# Patient Record
Sex: MALE | Race: BLACK OR AFRICAN AMERICAN | NOT HISPANIC OR LATINO | Employment: FULL TIME | URBAN - METROPOLITAN AREA
[De-identification: names, ages, dates, MRNs, and addresses within clinical notes are randomized per-mention and may not be internally consistent; named-entity substitution may affect disease eponyms.]

---

## 2019-12-09 ENCOUNTER — OFFICE VISIT (OUTPATIENT)
Dept: FAMILY MEDICINE CLINIC | Facility: CLINIC | Age: 39
End: 2019-12-09

## 2019-12-09 VITALS
HEART RATE: 66 BPM | BODY MASS INDEX: 28.1 KG/M2 | SYSTOLIC BLOOD PRESSURE: 124 MMHG | WEIGHT: 226 LBS | HEIGHT: 75 IN | OXYGEN SATURATION: 98 % | RESPIRATION RATE: 16 BRPM | DIASTOLIC BLOOD PRESSURE: 70 MMHG

## 2019-12-09 DIAGNOSIS — J30.89 CHRONIC NON-SEASONAL ALLERGIC RHINITIS: Primary | ICD-10-CM

## 2019-12-09 DIAGNOSIS — Z23 NEED FOR VACCINATION: ICD-10-CM

## 2019-12-09 DIAGNOSIS — K59.09 CHRONIC CONSTIPATION: ICD-10-CM

## 2019-12-09 PROCEDURE — 90471 IMMUNIZATION ADMIN: CPT | Performed by: FAMILY MEDICINE

## 2019-12-09 PROCEDURE — 99203 OFFICE O/P NEW LOW 30 MIN: CPT | Performed by: FAMILY MEDICINE

## 2019-12-09 PROCEDURE — 90674 CCIIV4 VAC NO PRSV 0.5 ML IM: CPT | Performed by: FAMILY MEDICINE

## 2019-12-09 RX ORDER — FLUTICASONE PROPIONATE 50 MCG
2 SPRAY, SUSPENSION (ML) NASAL DAILY
Qty: 18.2 ML | Refills: 5 | Status: SHIPPED | OUTPATIENT
Start: 2019-12-09 | End: 2019-12-10 | Stop reason: SDUPTHER

## 2019-12-09 NOTE — PROGRESS NOTES
Christina Graham is a 39 y.o. male. Pt is a new pt for the clinic and he is here to establish new PCP and talk about depression, pain in her knees and sore throat and headache.   He started having sore throat and headaches yesterday.   He states has pain in knees, bilateral, for about 2 years. Comes and goes. Denies swelling or trauma.   Pt feels a little depressed since he moved from [place] 5 months ago. He is alone, lives by himself, and has no friends. States wants to do nothing but be in the bed and drink alcohol at weekend. Denies alcohol consume during the week.     Chief Complaint   Patient presents with   • Sore Throat   • Knee Pain       HPI     Here as above. Moved for work, has an ex-girlfriend and two children back in [place]. Hasn't been able to establish much of a social life here yet as he's been working hard.  PHQ 9 score is 7 today.    Has chronic sinus congestion and allergic rhinitis. Was previously prescribed a nasal steroid which help symptoms significantly, but hasn't had any in a while. Interested in re-starting.     Complains of occasional bleeding with bowel movements.  Reports infrequent hard stools.  Tries to stay well-hydrated, tries to eat a diet high in fiber, however is not able to do as well as the like.  Not currently taking any laxatives, nor any bowel regulators.    Has not yet had a flu shot this year, willing to do so today.    The following portions of the patient's history were reviewed and updated as appropriate: allergies, current medications, past family history, past medical history, past social history, past surgical history and problem list.    Review of Systems   HENT: Positive for ear pain and sore throat.    Musculoskeletal: Positive for arthralgias.   Neurological: Positive for headaches.   Psychiatric/Behavioral: Positive for sleep disturbance. The patient is nervous/anxious.    All other systems reviewed and are negative.    I have reviewed the ROS as documented by  the MA. Faizan Macario MD    Objective  Vitals:    12/09/19 1126   BP: 124/70   Pulse: 66   Resp: 16   SpO2: 98%     Body mass index is 28.25 kg/m².    Physical Exam   Constitutional: He is oriented to person, place, and time. He appears well-developed and well-nourished. No distress.   HENT:   Nose: Mucosal edema present. No rhinorrhea.   Eyes: Pupils are equal, round, and reactive to light. EOM are normal.   Neck: Normal range of motion. Neck supple.   Cardiovascular: Normal rate, regular rhythm, normal heart sounds and intact distal pulses.   No murmur heard.  Pulmonary/Chest: Effort normal and breath sounds normal. No respiratory distress. He has no wheezes.   Abdominal: Soft. Bowel sounds are normal. There is no tenderness. There is no rebound and no guarding.   Musculoskeletal: Normal range of motion.   Neurological: He is alert and oriented to person, place, and time.   Skin: Skin is warm and dry.   Areas of xerosis on cheeks, around hair line, and on hands   Psychiatric: He has a normal mood and affect. His behavior is normal.   Nursing note and vitals reviewed.        Current Outpatient Medications:   •  fluticasone (FLONASE) 50 MCG/ACT nasal spray, 2 sprays into the nostril(s) as directed by provider Daily., Disp: 18.2 mL, Rfl: 5  •  polyethylene glycol (MIRALAX) powder powder, Mix one unit dose in 12-16 oz of water and take 1-4 times daily to begin having soft daily BMs. Taper down to once daily dosing as needed., Disp: 850 g, Rfl: 5  Current outpatient and discharge medications have been reconciled for the patient.  Reviewed by: Faizan Macario MD      Procedures    Lab Results (most recent)     None                  Christina was seen today for sore throat and knee pain.    Diagnoses and all orders for this visit:    Chronic non-seasonal allergic rhinitis  -     fluticasone (FLONASE) 50 MCG/ACT nasal spray; 2 sprays into the nostril(s) as directed by provider Daily.    Chronic  constipation    Other orders  -     polyethylene glycol (MIRALAX) powder powder; Mix one unit dose in 12-16 oz of water and take 1-4 times daily to begin having soft daily BMs. Taper down to once daily dosing as needed.    Orders as above.  Discussed proper use of fluticasone nasal spray.  Discussed proper titration of PEG for 1-2 soft bowel movements daily.  Encouraged him to get more hydration.  Follow-up as below.    Any information loaded into the AVS was placed there by KENNETH Macario MD, and patient was counseled on the same.   Return in about 4 weeks (around 1/6/2020).      KENNETH Macario MD

## 2019-12-09 NOTE — PATIENT INSTRUCTIONS
Buy some Cetaphil cream (white tub, blue lid) from pharmacy or grocery and use every day and after you shower to help with dry skin.      Constipation, Adult    Constipation is when a person:  · Poops (has a bowel movement) fewer times in a week than normal.  · Has a hard time pooping.  · Has poop that is dry, hard, or bigger than normal.  Follow these instructions at home:  Eating and drinking    · Eat foods that have a lot of fiber, such as:  ? Fresh fruits and vegetables.  ? Whole grains.  ? Beans.  · Eat less of foods that are high in fat, low in fiber, or overly processed, such as:  ? French fries.  ? Hamburgers.  ? Cookies.  ? Candy.  ? Soda.  · Drink enough fluid to keep your pee (urine) clear or pale yellow.  General instructions  · Exercise regularly or as told by your doctor.  · Go to the restroom when you feel like you need to poop. Do not hold it in.  · Take over-the-counter and prescription medicines only as told by your doctor. These include any fiber supplements.  · Do pelvic floor retraining exercises, such as:  ? Doing deep breathing while relaxing your lower belly (abdomen).  ? Relaxing your pelvic floor while pooping.  · Watch your condition for any changes.  · Keep all follow-up visits as told by your doctor. This is important.  Contact a doctor if:  · You have pain that gets worse.  · You have a fever.  · You have not pooped for 4 days.  · You throw up (vomit).  · You are not hungry.  · You lose weight.  · You are bleeding from the anus.  · You have thin, pencil-like poop (stool).  Get help right away if:  · You have a fever, and your symptoms suddenly get worse.  · You leak poop or have blood in your poop.  · Your belly feels hard or bigger than normal (is bloated).  · You have very bad belly pain.  · You feel dizzy or you faint.  This information is not intended to replace advice given to you by your health care provider. Make sure you discuss any questions you have with your health care  provider.  Document Released: 06/05/2009 Document Revised: 07/07/2017 Document Reviewed: 06/07/2017  [employer] Interactive Patient Education © 2019 [employer]

## 2019-12-10 DIAGNOSIS — J30.89 CHRONIC NON-SEASONAL ALLERGIC RHINITIS: ICD-10-CM

## 2019-12-10 RX ORDER — FLUTICASONE PROPIONATE 50 MCG
2 SPRAY, SUSPENSION (ML) NASAL DAILY
Qty: 18.2 ML | Refills: 5 | Status: SHIPPED | OUTPATIENT
Start: 2019-12-10

## 2019-12-10 NOTE — TELEPHONE ENCOUNTER
Pt called yesterday and left a VM saying would like his meds sent to a different pharmacy. Sound on msg was not great, could not understand pharmacy location. Called him, no answer.

## 2020-02-19 ENCOUNTER — OFFICE VISIT (OUTPATIENT)
Dept: FAMILY MEDICINE CLINIC | Facility: CLINIC | Age: 40
End: 2020-02-19

## 2020-02-19 ENCOUNTER — TELEPHONE (OUTPATIENT)
Dept: FAMILY MEDICINE CLINIC | Facility: CLINIC | Age: 40
End: 2020-02-19

## 2020-02-19 VITALS
BODY MASS INDEX: 26.49 KG/M2 | RESPIRATION RATE: 16 BRPM | OXYGEN SATURATION: 98 % | WEIGHT: 213 LBS | SYSTOLIC BLOOD PRESSURE: 120 MMHG | HEIGHT: 75 IN | HEART RATE: 73 BPM | DIASTOLIC BLOOD PRESSURE: 74 MMHG

## 2020-02-19 DIAGNOSIS — L85.3 XEROSIS CUTIS: ICD-10-CM

## 2020-02-19 DIAGNOSIS — K59.09 CHRONIC CONSTIPATION: Primary | ICD-10-CM

## 2020-02-19 PROCEDURE — 99213 OFFICE O/P EST LOW 20 MIN: CPT | Performed by: FAMILY MEDICINE

## 2020-02-19 NOTE — PROGRESS NOTES
Christina Graham is a 40 y.o. male. Pt is here for constipation f/u.     Chief Complaint   Patient presents with   • Constipation   • Rash       HPI         The following portions of the patient's history were reviewed and updated as appropriate: allergies, current medications, past family history, past medical history, past social history, past surgical history and problem list.    Review of Systems   Constitutional: Negative for activity change.   Gastrointestinal: Positive for abdominal distention and constipation.     I have reviewed and confirmed the ROS as documented by the MA. Faizan Macario MD    Objective  Vitals:    02/19/20 1359   BP: 120/74   Pulse: 73   Resp: 16   SpO2: 98%     Body mass index is 26.62 kg/m².    Physical Exam   Constitutional: He appears well-developed and well-nourished.   Abdominal: Soft. Bowel sounds are normal. He exhibits no distension. There is no tenderness.   Skin: Skin is dry.   Nursing note and vitals reviewed.        Current Outpatient Medications:   •  fluticasone (FLONASE) 50 MCG/ACT nasal spray, 2 sprays into the nostril(s) as directed by provider Daily., Disp: 18.2 mL, Rfl: 5  •  polyethylene glycol (MIRALAX) powder powder, Mix one unit dose in 12-16 oz of water and take 1-4 times daily to begin having soft daily BMs. Taper down to once daily dosing as needed., Disp: 850 g, Rfl: 5  Current outpatient and discharge medications have been reconciled for the patient.  Reviewed by: Faizan Macario MD      Procedures    Lab Results (most recent)     None                  Christina was seen today for constipation and rash.    Diagnoses and all orders for this visit:    Chronic constipation    Xerosis cutis    Recommended a few days off work for a bowel clean out, note given. He has sufficient supply of PEG at home.     Recommended sensitive skin soap and emollient after bathing. Info given in AVS.    Any information loaded into the AVS was placed there by KENNETH Feldman  MD Amirah, and patient was counseled on the same.   Return in about 3 months (around 5/19/2020).      KENNETH Macario MD

## 2020-02-19 NOTE — PATIENT INSTRUCTIONS
Wash with sensitive skin soap (like Dove) and then use a cream on dry skin after bathing - Cetaphil, or shea or lorie butter. Buy it in a jar if you can.

## 2020-02-19 NOTE — TELEPHONE ENCOUNTER
Pt called and stated that he thought something was suppose to be called in for him from [facility] today. Pharmacy stated they did not recieve anything.    Please advise    Pt call back  [phone]

## 2020-03-16 ENCOUNTER — TELEPHONE (OUTPATIENT)
Dept: PEDIATRICS | Facility: OTHER | Age: 40
End: 2020-03-16

## 2020-03-16 NOTE — TELEPHONE ENCOUNTER
PT CALLED STATING THAT HE HAD TO MISS WORK TODAY BECAUSE THE MEDICATION PRESCRIBED GAVE HIM DIARRHEA. WILL COME TO THE OFFICE TOMORROW TO  NOTE.

## 2020-03-24 ENCOUNTER — OFFICE VISIT (OUTPATIENT)
Dept: FAMILY MEDICINE CLINIC | Facility: CLINIC | Age: 40
End: 2020-03-24

## 2020-03-24 VITALS
RESPIRATION RATE: 16 BRPM | WEIGHT: 221 LBS | HEIGHT: 75 IN | BODY MASS INDEX: 27.48 KG/M2 | OXYGEN SATURATION: 98 % | HEART RATE: 72 BPM | SYSTOLIC BLOOD PRESSURE: 128 MMHG | DIASTOLIC BLOOD PRESSURE: 74 MMHG

## 2020-03-24 DIAGNOSIS — K62.89 ANAL IRRITATION: Primary | ICD-10-CM

## 2020-03-24 DIAGNOSIS — K59.09 CHRONIC CONSTIPATION: ICD-10-CM

## 2020-03-24 PROCEDURE — 99213 OFFICE O/P EST LOW 20 MIN: CPT | Performed by: FAMILY MEDICINE

## 2020-03-24 RX ORDER — DIAPER,BRIEF,INFANT-TODD,DISP
EACH MISCELLANEOUS 2 TIMES DAILY
Qty: 56 G | Refills: 0 | Status: SHIPPED | OUTPATIENT
Start: 2020-03-24

## 2020-03-24 NOTE — PROGRESS NOTES
Christina Graham is a 40 y.o. male. Pt is here for abdominal pain and rectal pain/bleeding .     Chief Complaint   Patient presents with   • Abdominal Pain   • Rectal Bleeding       HPI     Here for follow-up as above.  We have been treating him for chronic constipation, though it does not sound like he has made much progress.  He is having a bowel movement most days, but is still having to strain significantly and having hard stools.  Has never taken more than 1 dose of the MiraLAX in a day.  Also having some general rectal irritation.  Has some occasional blood on the toilet paper, very intermittent blood-streaked stool.    The following portions of the patient's history were reviewed and updated as appropriate: allergies, current medications, past family history, past medical history, past social history, past surgical history and problem list.    Review of Systems   Constitutional: Negative for chills, fever and unexpected weight change.   Gastrointestinal: Positive for abdominal distention, abdominal pain, anal bleeding, constipation and rectal pain.     I have reviewed and confirmed the ROS as documented by the MA. Faizan Macario MD    Objective  Vitals:    03/24/20 1357   BP: 128/74   Pulse: 72   Resp: 16   SpO2: 98%     Body mass index is 27.62 kg/m².    Physical Exam   Constitutional: He appears well-developed and well-nourished.   Cardiovascular: Normal rate, regular rhythm and normal heart sounds.   Abdominal: Soft. Bowel sounds are normal. He exhibits no distension. There is no tenderness.   Genitourinary: Rectal exam shows no external hemorrhoid and no fissure.   Nursing note and vitals reviewed.        Current Outpatient Medications:   •  fluticasone (FLONASE) 50 MCG/ACT nasal spray, 2 sprays into the nostril(s) as directed by provider Daily., Disp: 18.2 mL, Rfl: 5  •  polyethylene glycol (MIRALAX) powder powder, Mix one unit dose in 12-16 oz of water and take 1-4 times daily to begin having soft  daily BMs. Taper down to once daily dosing as needed., Disp: 850 g, Rfl: 5  •  hydrocortisone 1 % ointment, Apply  topically to the appropriate area as directed 2 (Two) Times a Day., Disp: 56 g, Rfl: 0  Current outpatient and discharge medications have been reconciled for the patient.  Reviewed by: Faizan Macario MD      Procedures    Lab Results (most recent)     None                  Christina was seen today for abdominal pain and rectal bleeding.    Diagnoses and all orders for this visit:    Anal irritation  -     hydrocortisone 1 % ointment; Apply  topically to the appropriate area as directed 2 (Two) Times a Day.    Chronic constipation    Went over the etiology and treatment of chronic constipation again.  Encouraged him to take 2-3 doses of MiraLAX a day to achieve soft daily bowel movements that require no straining.  Advised him to use some topical hydrocortisone to help with anal irritation.  Suggested he follow-up as below.  Encouraged him to communicate via [employer] in the meantime.    Any information loaded into the AVS was placed there by KENNETH Macario MD, and patient was counseled on the same.   Return in about 4 weeks (around 4/21/2020).      KENNETH Macario MD

## 2020-05-04 ENCOUNTER — TELEPHONE (OUTPATIENT)
Dept: FAMILY MEDICINE CLINIC | Facility: CLINIC | Age: 40
End: 2020-05-04

## 2021-04-02 ENCOUNTER — BULK ORDERING (OUTPATIENT)
Dept: CASE MANAGEMENT | Facility: OTHER | Age: 41
End: 2021-04-02

## 2021-04-02 DIAGNOSIS — Z23 IMMUNIZATION DUE: ICD-10-CM
